# Patient Record
Sex: MALE | Race: WHITE | NOT HISPANIC OR LATINO | Employment: STUDENT | URBAN - METROPOLITAN AREA
[De-identification: names, ages, dates, MRNs, and addresses within clinical notes are randomized per-mention and may not be internally consistent; named-entity substitution may affect disease eponyms.]

---

## 2019-06-25 ENCOUNTER — OFFICE VISIT (OUTPATIENT)
Dept: URGENT CARE | Facility: CLINIC | Age: 39
End: 2019-06-25
Payer: COMMERCIAL

## 2019-06-25 VITALS
TEMPERATURE: 97.6 F | DIASTOLIC BLOOD PRESSURE: 88 MMHG | BODY MASS INDEX: 27.1 KG/M2 | OXYGEN SATURATION: 95 % | HEIGHT: 71 IN | RESPIRATION RATE: 16 BRPM | HEART RATE: 93 BPM | WEIGHT: 193.6 LBS | SYSTOLIC BLOOD PRESSURE: 120 MMHG

## 2019-06-25 DIAGNOSIS — H57.89 REDNESS OF EYE, RIGHT: ICD-10-CM

## 2019-06-25 DIAGNOSIS — S05.01XA ABRASION OF RIGHT CORNEA, INITIAL ENCOUNTER: Primary | ICD-10-CM

## 2019-06-25 PROCEDURE — G0382 LEV 3 HOSP TYPE B ED VISIT: HCPCS | Performed by: FAMILY MEDICINE

## 2019-06-25 PROCEDURE — 99283 EMERGENCY DEPT VISIT LOW MDM: CPT | Performed by: FAMILY MEDICINE

## 2019-06-25 RX ORDER — LEVOTHYROXINE SODIUM 0.03 MG/1
25 TABLET ORAL DAILY
Refills: 1 | COMMUNITY
Start: 2019-04-10

## 2019-06-25 RX ORDER — TETRACAINE HYDROCHLORIDE 5 MG/ML
1 SOLUTION OPHTHALMIC ONCE
Status: COMPLETED | OUTPATIENT
Start: 2019-06-25 | End: 2019-06-25

## 2019-06-25 RX ORDER — DEXTROAMPHETAMINE SACCHARATE, AMPHETAMINE ASPARTATE, DEXTROAMPHETAMINE SULFATE AND AMPHETAMINE SULFATE 7.5; 7.5; 7.5; 7.5 MG/1; MG/1; MG/1; MG/1
TABLET ORAL
Refills: 0 | COMMUNITY
Start: 2019-06-04

## 2019-06-25 RX ORDER — TOBRAMYCIN 3 MG/ML
1 SOLUTION/ DROPS OPHTHALMIC
Qty: 5 ML | Refills: 0 | Status: SHIPPED | OUTPATIENT
Start: 2019-06-25

## 2019-06-25 RX ORDER — VALACYCLOVIR HYDROCHLORIDE 1 G/1
TABLET, FILM COATED ORAL
COMMUNITY
Start: 2018-11-16

## 2019-06-25 RX ORDER — FLUTICASONE PROPIONATE 50 MCG
1 SPRAY, SUSPENSION (ML) NASAL DAILY
COMMUNITY
Start: 2014-12-22

## 2019-06-25 RX ORDER — FEXOFENADINE HCL 180 MG/1
1 TABLET ORAL DAILY
COMMUNITY
Start: 2014-09-15

## 2019-06-25 RX ORDER — DEXTROAMPHETAMINE SACCHARATE, AMPHETAMINE ASPARTATE, DEXTROAMPHETAMINE SULFATE AND AMPHETAMINE SULFATE 2.5; 2.5; 2.5; 2.5 MG/1; MG/1; MG/1; MG/1
TABLET ORAL
COMMUNITY
Start: 2019-05-31

## 2019-06-25 RX ADMIN — TETRACAINE HYDROCHLORIDE 1 DROP: 5 SOLUTION OPHTHALMIC at 14:59

## 2024-04-13 ENCOUNTER — OFFICE VISIT (OUTPATIENT)
Dept: URGENT CARE | Facility: CLINIC | Age: 44
End: 2024-04-13
Payer: COMMERCIAL

## 2024-04-13 VITALS
HEART RATE: 96 BPM | OXYGEN SATURATION: 97 % | WEIGHT: 190 LBS | RESPIRATION RATE: 14 BRPM | BODY MASS INDEX: 26.6 KG/M2 | HEIGHT: 71 IN | TEMPERATURE: 100 F

## 2024-04-13 DIAGNOSIS — B34.9 VIRAL INFECTION: Primary | ICD-10-CM

## 2024-04-13 PROCEDURE — G0383 LEV 4 HOSP TYPE B ED VISIT: HCPCS | Performed by: PHYSICIAN ASSISTANT

## 2024-04-13 PROCEDURE — 99284 EMERGENCY DEPT VISIT MOD MDM: CPT | Performed by: PHYSICIAN ASSISTANT

## 2024-04-13 NOTE — PROGRESS NOTES
[facility]        NAME: Efrain Aldana is a 43 y.o. male  : 1980    MRN: [id number]  DATE: 2024  TIME: 9:41 AM    Assessment and Plan   Viral infection [B34.9]  1. Viral infection              Patient Instructions       Follow up with PCP in 3-5 days.  Proceed to  ER if symptoms worsen.    If tests have been performed at [facility], our office will contact you with results if changes need to be made to the care plan discussed with you at the visit.  [facility]    Chief Complaint     Chief Complaint   Patient presents with    head congestion      Pt states that it started Thursday night. Pt states that he has a headache, fever, loss of wanting to eat.          History of Present Illness       Patient presents with 2 days of fever, sinus congestion, fatigue, cloudiness, runny nose, loss of appetite, cough.  Also a few episodes of vomiting. Had diarrhea but that resolved.   Denies rash, chest pains, SOB, dyspnea, abdominal pains,   Daughter sick with similar symptoms recently.         Review of Systems   Review of Systems   Constitutional:  Positive for appetite change, chills, fatigue and fever.   HENT:  Positive for congestion, rhinorrhea and sinus pressure. Negative for ear discharge, ear pain, postnasal drip, sinus pain and sore throat.    Respiratory:  Positive for cough. Negative for chest tightness, shortness of breath and wheezing.    Cardiovascular:  Negative for chest pain and palpitations.   Gastrointestinal:  Positive for diarrhea, nausea and vomiting. Negative for abdominal pain.   Musculoskeletal:  Positive for myalgias. Negative for arthralgias.   Neurological:  Positive for weakness and headaches.   Psychiatric/Behavioral:  Negative for confusion.          Current Medications       Current Outpatient Medications:     amphetamine-dextroamphetamine (ADDERALL) 30 MG tablet, TAKE 1 TABLET (30 MG TOTAL) BY MOUTH DAILY. MAX DAILY AMOUNT: 30  "MG, Disp: , Rfl: 0    amphetamine-dextroamphetamine (ADDERALL, 10MG,) 10 mg tablet, Use in the afternoon if needed, Disp: , Rfl:     fexofenadine (ALLEGRA) 180 MG tablet, Take 1 tablet by mouth daily, Disp: , Rfl:     fluticasone (FLONASE) 50 mcg/act nasal spray, 1 spray into each nostril daily, Disp: , Rfl:     levothyroxine 25 mcg tablet, Take 25 mcg by mouth daily, Disp: , Rfl: 1    tobramycin (TOBREX) 0.3 % SOLN, Administer 1 drop to the right eye every 4 (four) hours while awake, Disp: 5 mL, Rfl: 0    valACYclovir (VALTREX) 1,000 mg tablet, 2 TABLETS EVERY 12 HOURS X 2 DOSES as needed for cold sores, Disp: , Rfl:     Current Allergies     Allergies as of 04/13/2024    (No Known Allergies)            The following portions of the patient's history were reviewed and updated as appropriate: allergies, current medications, past family history, past medical history, past social history, past surgical history and problem list.     Past Medical History:   Diagnosis Date    ADHD (attention deficit hyperactivity disorder)     Disease of thyroid gland        Past Surgical History:   Procedure Laterality Date    WISDOM TOOTH EXTRACTION         Family History   Problem Relation Age of Onset    Hyperlipidemia Mother     Hyperlipidemia Father          Medications have been verified.        Objective   Pulse 96   Temp 100 °F (37.8 °C) (Tympanic)   Resp 14   Ht 5' 11\" (1.803 m)   Wt 86.2 kg (190 lb)   SpO2 97%   BMI 26.50 kg/m²   No LMP for male patient.       Physical Exam     Physical Exam  Constitutional:       General: He is not in acute distress.     Appearance: Normal appearance. He is not ill-appearing or diaphoretic.   HENT:      Right Ear: Tympanic membrane, ear canal and external ear normal.      Left Ear: Tympanic membrane, ear canal and external ear normal.      Nose: Nose normal.      Mouth/Throat:      Mouth: Mucous membranes are moist.      Pharynx: Oropharynx is clear.   Eyes:      Conjunctiva/sclera: " Conjunctivae normal.   Cardiovascular:      Rate and Rhythm: Normal rate and regular rhythm.      Heart sounds: Normal heart sounds.   Pulmonary:      Effort: Pulmonary effort is normal.      Breath sounds: Normal breath sounds.   Abdominal:      General: Abdomen is flat. Bowel sounds are normal.      Palpations: Abdomen is soft.      Tenderness: There is no abdominal tenderness. There is no guarding or rebound.   Skin:     General: Skin is warm and dry.   Neurological:      Mental Status: He is alert.   Psychiatric:         Mood and Affect: Mood normal.         Behavior: Behavior normal.

## 2024-12-03 ENCOUNTER — TELEPHONE (OUTPATIENT)
Age: 44
End: 2024-12-03

## 2024-12-03 ENCOUNTER — OFFICE VISIT (OUTPATIENT)
Dept: GASTROENTEROLOGY | Facility: CLINIC | Age: 44
End: 2024-12-03
Payer: COMMERCIAL

## 2024-12-03 ENCOUNTER — TELEPHONE (OUTPATIENT)
Dept: GASTROENTEROLOGY | Facility: CLINIC | Age: 44
End: 2024-12-03

## 2024-12-03 VITALS
BODY MASS INDEX: 27.44 KG/M2 | HEIGHT: 71 IN | TEMPERATURE: 97.5 F | WEIGHT: 196 LBS | DIASTOLIC BLOOD PRESSURE: 90 MMHG | SYSTOLIC BLOOD PRESSURE: 130 MMHG

## 2024-12-03 DIAGNOSIS — R19.4 CHANGE IN BOWEL HABITS: ICD-10-CM

## 2024-12-03 DIAGNOSIS — T18.128A ESOPHAGEAL OBSTRUCTION DUE TO FOOD IMPACTION: Primary | ICD-10-CM
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
## 2024-12-03 DIAGNOSIS — R12 HEARTBURN: ICD-10-CM

## 2024-12-03 PROCEDURE — 99204 OFFICE O/P NEW MOD 45 MIN: CPT | Performed by: PHYSICIAN ASSISTANT

## 2024-12-03 RX ORDER — SUCRALFATE ORAL 1 G/10ML
1 SUSPENSION ORAL 4 TIMES DAILY
Qty: 1200 ML | Refills: 3 | Status: SHIPPED | OUTPATIENT
Start: 2024-12-03

## 2024-12-03 RX ORDER — OMEPRAZOLE 40 MG/1
40 CAPSULE, DELAYED RELEASE ORAL 2 TIMES DAILY
COMMUNITY
Start: 2024-11-28 | End: 2024-12-28

## 2024-12-03 RX ORDER — SODIUM CHLORIDE, SODIUM LACTATE, POTASSIUM CHLORIDE, CALCIUM CHLORIDE 600; 310; 30; 20 MG/100ML; MG/100ML; MG/100ML; MG/100ML
125 INJECTION, SOLUTION INTRAVENOUS CONTINUOUS
Status: CANCELLED | OUTPATIENT
Start: 2024-12-03

## 2024-12-03 NOTE — H&P (VIEW-ONLY)
"Name: Efrain Aldana      : 1980      MRN: [id number]  Encounter Provider: Gissell Gilmore PA-C  Encounter Date: 12/3/2024   Encounter department: [facility] [place]  :  Assessment & Plan  Esophageal obstruction due to food impaction  Patient says that he has had issues with reflux and trouble swallowing solid foods intermittently \"for a while \" however this recently started to worsen and has been occurring more often.  Patient presented to [facility] on  with the feeling of choking and food stuck in his esophagus after eating lobster. CT noted \"  Mildly patulous and fluid-filled esophagus with mild wall thickening which   can indicate reflux and/or esophagitis. \" They trialed PPI, famotidine and glucagon in the ER without relief so he underwent emergent EGD at that time.  The EGD noted that the food bolus was in the distal third of the esophagus at the gastroesophageal junction which was removed both with a 3 prong and then pushed the rest into the stomach.  The EGD also noted \"the upper and middle thirds of esophagus showed mucosal findings suspicious for EOE\" however biopsies were not done.  It was discussed at his discharge that he needs an outpatient EGD with biopsies to rule out EOE.  He says he has only been on omeprazole twice a day for the last day or so but continues to have trouble swallowing solid food several times a week.  He does usually does not have issues liquids.  He does admit to eating less over the last week or so since this occurred but also says that he has been having constipation, which is unlike him.  He says that when he does move his bowels, only \"small amount of hard keya.\" He says he has been having BL LQ discomfort since this started. Pt does admit to drinking \"a lot of redbull\" and not eating throughout the day.  -EGD ordered with esophageal, gastric, duodenal bx ordered   -continue omeprazole 40 mg BID  -start " "carafate suspension as needed   -anti-gerd diet dicussed and handout printed     Orders:    EGD; Future    Change in bowel habits  See above.   -explained this certainly can be happening due to him eating less but he and his wife would like a colonoscopy done to ensure, which I explained is a good idea especially with his age  -start OTC miralax daily (pt's wife says she may give him the OTC dulcolax at home first)  -please ensure you are drinking at least 64 ounces of water/day  Orders:    Colonoscopy; Future        History of Present Illness     HPI  Efrain Aldana is a 44 y.o. male who presents for hospital follow-up. Patient says that he has had issues with reflux and trouble swallowing solid foods intermittently \"for a while \" however this recently started to worsen and has been occurring more often.  He says he has only been on omeprazole twice a day for the last day or so but continues to have trouble swallowing solid food several times a week.  He does usually does not have issues liquids.  He does admit to eating less over the last week or so since this occurred but also says that he has been having constipation, which is unlike him.  He says that when he does move his bowels, only \"small amount of hard keya.\" He says he has been having BL LQ discomfort since this started. He denies n/v, painful swallowing, diarrhea, family hx of colon cancer, unintentional weight loss, fevers, chills, night sweats, frequent NSAID use, bloody or black BM. Pt does admit to drinking \"a lot of redbull\" and not eating throughout the day.  History obtained from: patient    Review of Systems   Constitutional:  Negative for chills and fever.   HENT:  Positive for trouble swallowing. Negative for ear pain and sore throat.    Eyes:  Negative for pain and visual disturbance.   Respiratory:  Negative for cough and shortness of breath.    Cardiovascular:  Negative for chest pain and palpitations.   Gastrointestinal:  Positive for " abdominal pain and constipation. Negative for abdominal distention, anal bleeding, blood in stool, diarrhea, nausea, rectal pain and vomiting.   Genitourinary:  Negative for dysuria and hematuria.   Musculoskeletal:  Negative for arthralgias and back pain.   Skin:  Negative for color change and rash.   Neurological:  Negative for seizures and syncope.   All other systems reviewed and are negative.    Past Medical History   Past Medical History:   Diagnosis Date    ADHD (attention deficit hyperactivity disorder)     Disease of thyroid gland      Past Surgical History:   Procedure Laterality Date    WISDOM TOOTH EXTRACTION       Family History   Problem Relation Age of Onset    Hyperlipidemia Mother     Hyperlipidemia Father       reports that he has never smoked. He has never used smokeless tobacco. He reports current alcohol use. He reports that he does not use drugs.  Current Outpatient Medications on File Prior to Visit   Medication Sig Dispense Refill    amphetamine-dextroamphetamine (ADDERALL) 30 MG tablet TAKE 1 TABLET (30 MG TOTAL) BY MOUTH DAILY. MAX DAILY AMOUNT: 30 MG  0    amphetamine-dextroamphetamine (ADDERALL, 10MG,) 10 mg tablet Use in the afternoon if needed      fexofenadine (ALLEGRA) 180 MG tablet Take 1 tablet by mouth daily      fluticasone (FLONASE) 50 mcg/act nasal spray 1 spray into each nostril daily      levothyroxine 25 mcg tablet Take 25 mcg by mouth daily  1    tobramycin (TOBREX) 0.3 % SOLN Administer 1 drop to the right eye every 4 (four) hours while awake 5 mL 0    valACYclovir (VALTREX) 1,000 mg tablet 2 TABLETS EVERY 12 HOURS X 2 DOSES as needed for cold sores       No current facility-administered medications on file prior to visit.   No Known Allergies   Medical History Reviewed by provider this encounter:     .  Past Medical History   Past Medical History:   Diagnosis Date    ADHD (attention deficit hyperactivity disorder)     Disease of thyroid gland      Past Surgical History:    Procedure Laterality Date    WISDOM TOOTH EXTRACTION       Family History   Problem Relation Age of Onset    Hyperlipidemia Mother     Hyperlipidemia Father       reports that he has never smoked. He has never used smokeless tobacco. He reports current alcohol use. He reports that he does not use drugs.  Current Outpatient Medications on File Prior to Visit   Medication Sig Dispense Refill    amphetamine-dextroamphetamine (ADDERALL) 30 MG tablet TAKE 1 TABLET (30 MG TOTAL) BY MOUTH DAILY. MAX DAILY AMOUNT: 30 MG (Patient taking differently: 30 mg)  0    amphetamine-dextroamphetamine (ADDERALL, 10MG,) 10 mg tablet Use in the afternoon if needed (Patient taking differently: 15 mg)      fexofenadine (ALLEGRA) 180 MG tablet Take 1 tablet by mouth daily      levothyroxine 25 mcg tablet Take 25 mcg by mouth daily  1    omeprazole (PriLOSEC) 40 MG capsule Take 40 mg by mouth 2 (two) times a day      valACYclovir (VALTREX) 1,000 mg tablet 2 TABLETS EVERY 12 HOURS X 2 DOSES as needed for cold sores      fluticasone (FLONASE) 50 mcg/act nasal spray 1 spray into each nostril daily (Patient not taking: Reported on 12/3/2024)      tobramycin (TOBREX) 0.3 % SOLN Administer 1 drop to the right eye every 4 (four) hours while awake (Patient not taking: Reported on 12/3/2024) 5 mL 0     No current facility-administered medications on file prior to visit.   No Known Allergies   Current Outpatient Medications on File Prior to Visit   Medication Sig Dispense Refill    amphetamine-dextroamphetamine (ADDERALL) 30 MG tablet TAKE 1 TABLET (30 MG TOTAL) BY MOUTH DAILY. MAX DAILY AMOUNT: 30 MG (Patient taking differently: 30 mg)  0    amphetamine-dextroamphetamine (ADDERALL, 10MG,) 10 mg tablet Use in the afternoon if needed (Patient taking differently: 15 mg)      fexofenadine (ALLEGRA) 180 MG tablet Take 1 tablet by mouth daily      levothyroxine 25 mcg tablet Take 25 mcg by mouth daily  1    omeprazole (PriLOSEC) 40 MG capsule Take 40 mg  by mouth 2 (two) times a day      valACYclovir (VALTREX) 1,000 mg tablet 2 TABLETS EVERY 12 HOURS X 2 DOSES as needed for cold sores      fluticasone (FLONASE) 50 mcg/act nasal spray 1 spray into each nostril daily (Patient not taking: Reported on 12/3/2024)      tobramycin (TOBREX) 0.3 % SOLN Administer 1 drop to the right eye every 4 (four) hours while awake (Patient not taking: Reported on 12/3/2024) 5 mL 0     No current facility-administered medications on file prior to visit.      Social History     Tobacco Use    Smoking status: Never    Smokeless tobacco: Never   Substance and Sexual Activity    Alcohol use: Yes    Drug use: Never    Sexual activity: Not on file        Objective   There were no vitals taken for this visit.     Physical Exam  Constitutional:       Appearance: Normal appearance.   Cardiovascular:      Rate and Rhythm: Normal rate and regular rhythm.      Heart sounds: Normal heart sounds.   Pulmonary:      Breath sounds: Normal breath sounds.   Abdominal:      General: Abdomen is flat. Bowel sounds are normal. There is no distension.      Palpations: Abdomen is soft. There is no mass.      Tenderness: There is abdominal tenderness in the left lower quadrant. There is no right CVA tenderness, left CVA tenderness, guarding or rebound.      Hernia: No hernia is present.   Neurological:      Mental Status: He is alert.         Administrative Statements   I have spent a total time of 30 minutes in caring for this patient on the day of the visit/encounter including Diagnostic results, Prognosis, Risks and benefits of tx options, Instructions for management, Patient and family education, Importance of tx compliance, Risk factor reductions, Impressions, Counseling / Coordination of care, Documenting in the medical record, Reviewing / ordering tests, medicine, procedures  , Obtaining or reviewing history  , and Communicating with other healthcare professionals .

## 2024-12-03 NOTE — TELEPHONE ENCOUNTER
LOV 12/03/24     reports her [facility] pharmacy informed her that the script for sucralfate suspension requires medical necessity documentation for approval. Out of pocket cost would be ~ $600.00.     Sucralfate tablets are approved. Discussed instructions for preparing a slurry by crushing/dissolving table with war water.  prefers to begin with PA for the suspension. She is informed that determination can take 7-21 business days.     will purchase a 7 day supply of sucralfate suspension for $50.00 d/t pt's current symptoms. If PA determination takes to long or is denied, pt will move forward with the tablet form.

## 2024-12-03 NOTE — PATIENT INSTRUCTIONS
Start taking omeprazole 40 mg ONE HOUR after the synthroid, wait 30 minutes, eat something, then have your caffeine after this if needed. Take your second dose 30 minutes before dinner   Try not to eat late at night BUT do not go to bed hungry: stay upright for at least 2 hours   Make sure you are eating throughout the day, every 4 hours or so  Make sure you are drinking at least 64 ounces of water/day   When you are constipated: over-the-counter miralax as needed for constipation     Scheduled date of EGD/colonoscopy (as of today):12.06.24  Physician performing EGD/colonoscopy:DR [facility]  [facility] of [facility]colonoscopy[facility]  Desired bowel prep reviewed with patient:ELIEZER  Instructions reviewed with patient by:JANINE  Clearances: N/A

## 2024-12-03 NOTE — PROGRESS NOTES
"Name: Efrain Aldana      : 1980      MRN: [id number]  Encounter Provider: Gissell Gilmore PA-C  Encounter Date: 12/3/2024   Encounter department: [facility] [place]  :  Assessment & Plan  Esophageal obstruction due to food impaction  Patient says that he has had issues with reflux and trouble swallowing solid foods intermittently \"for a while \" however this recently started to worsen and has been occurring more often.  Patient presented to [facility] on  with the feeling of choking and food stuck in his esophagus after eating lobster. CT noted \"  Mildly patulous and fluid-filled esophagus with mild wall thickening which   can indicate reflux and/or esophagitis. \" They trialed PPI, famotidine and glucagon in the ER without relief so he underwent emergent EGD at that time.  The EGD noted that the food bolus was in the distal third of the esophagus at the gastroesophageal junction which was removed both with a 3 prong and then pushed the rest into the stomach.  The EGD also noted \"the upper and middle thirds of esophagus showed mucosal findings suspicious for EOE\" however biopsies were not done.  It was discussed at his discharge that he needs an outpatient EGD with biopsies to rule out EOE.  He says he has only been on omeprazole twice a day for the last day or so but continues to have trouble swallowing solid food several times a week.  He does usually does not have issues liquids.  He does admit to eating less over the last week or so since this occurred but also says that he has been having constipation, which is unlike him.  He says that when he does move his bowels, only \"small amount of hard keya.\" He says he has been having BL LQ discomfort since this started. Pt does admit to drinking \"a lot of redbull\" and not eating throughout the day.  -EGD ordered with esophageal, gastric, duodenal bx ordered   -continue omeprazole 40 mg BID  -start " "carafate suspension as needed   -anti-gerd diet dicussed and handout printed     Orders:    EGD; Future    Change in bowel habits  See above.   -explained this certainly can be happening due to him eating less but he and his wife would like a colonoscopy done to ensure, which I explained is a good idea especially with his age  -start OTC miralax daily (pt's wife says she may give him the OTC dulcolax at home first)  -please ensure you are drinking at least 64 ounces of water/day  Orders:    Colonoscopy; Future        History of Present Illness     HPI  Efrain Aldana is a 44 y.o. male who presents for hospital follow-up. Patient says that he has had issues with reflux and trouble swallowing solid foods intermittently \"for a while \" however this recently started to worsen and has been occurring more often.  He says he has only been on omeprazole twice a day for the last day or so but continues to have trouble swallowing solid food several times a week.  He does usually does not have issues liquids.  He does admit to eating less over the last week or so since this occurred but also says that he has been having constipation, which is unlike him.  He says that when he does move his bowels, only \"small amount of hard keya.\" He says he has been having BL LQ discomfort since this started. He denies n/v, painful swallowing, diarrhea, family hx of colon cancer, unintentional weight loss, fevers, chills, night sweats, frequent NSAID use, bloody or black BM. Pt does admit to drinking \"a lot of redbull\" and not eating throughout the day.  History obtained from: patient    Review of Systems   Constitutional:  Negative for chills and fever.   HENT:  Positive for trouble swallowing. Negative for ear pain and sore throat.    Eyes:  Negative for pain and visual disturbance.   Respiratory:  Negative for cough and shortness of breath.    Cardiovascular:  Negative for chest pain and palpitations.   Gastrointestinal:  Positive for " abdominal pain and constipation. Negative for abdominal distention, anal bleeding, blood in stool, diarrhea, nausea, rectal pain and vomiting.   Genitourinary:  Negative for dysuria and hematuria.   Musculoskeletal:  Negative for arthralgias and back pain.   Skin:  Negative for color change and rash.   Neurological:  Negative for seizures and syncope.   All other systems reviewed and are negative.    Past Medical History   Past Medical History:   Diagnosis Date    ADHD (attention deficit hyperactivity disorder)     Disease of thyroid gland      Past Surgical History:   Procedure Laterality Date    WISDOM TOOTH EXTRACTION       Family History   Problem Relation Age of Onset    Hyperlipidemia Mother     Hyperlipidemia Father       reports that he has never smoked. He has never used smokeless tobacco. He reports current alcohol use. He reports that he does not use drugs.  Current Outpatient Medications on File Prior to Visit   Medication Sig Dispense Refill    amphetamine-dextroamphetamine (ADDERALL) 30 MG tablet TAKE 1 TABLET (30 MG TOTAL) BY MOUTH DAILY. MAX DAILY AMOUNT: 30 MG  0    amphetamine-dextroamphetamine (ADDERALL, 10MG,) 10 mg tablet Use in the afternoon if needed      fexofenadine (ALLEGRA) 180 MG tablet Take 1 tablet by mouth daily      fluticasone (FLONASE) 50 mcg/act nasal spray 1 spray into each nostril daily      levothyroxine 25 mcg tablet Take 25 mcg by mouth daily  1    tobramycin (TOBREX) 0.3 % SOLN Administer 1 drop to the right eye every 4 (four) hours while awake 5 mL 0    valACYclovir (VALTREX) 1,000 mg tablet 2 TABLETS EVERY 12 HOURS X 2 DOSES as needed for cold sores       No current facility-administered medications on file prior to visit.   No Known Allergies   Medical History Reviewed by provider this encounter:     .  Past Medical History   Past Medical History:   Diagnosis Date    ADHD (attention deficit hyperactivity disorder)     Disease of thyroid gland      Past Surgical History:    Procedure Laterality Date    WISDOM TOOTH EXTRACTION       Family History   Problem Relation Age of Onset    Hyperlipidemia Mother     Hyperlipidemia Father       reports that he has never smoked. He has never used smokeless tobacco. He reports current alcohol use. He reports that he does not use drugs.  Current Outpatient Medications on File Prior to Visit   Medication Sig Dispense Refill    amphetamine-dextroamphetamine (ADDERALL) 30 MG tablet TAKE 1 TABLET (30 MG TOTAL) BY MOUTH DAILY. MAX DAILY AMOUNT: 30 MG (Patient taking differently: 30 mg)  0    amphetamine-dextroamphetamine (ADDERALL, 10MG,) 10 mg tablet Use in the afternoon if needed (Patient taking differently: 15 mg)      fexofenadine (ALLEGRA) 180 MG tablet Take 1 tablet by mouth daily      levothyroxine 25 mcg tablet Take 25 mcg by mouth daily  1    omeprazole (PriLOSEC) 40 MG capsule Take 40 mg by mouth 2 (two) times a day      valACYclovir (VALTREX) 1,000 mg tablet 2 TABLETS EVERY 12 HOURS X 2 DOSES as needed for cold sores      fluticasone (FLONASE) 50 mcg/act nasal spray 1 spray into each nostril daily (Patient not taking: Reported on 12/3/2024)      tobramycin (TOBREX) 0.3 % SOLN Administer 1 drop to the right eye every 4 (four) hours while awake (Patient not taking: Reported on 12/3/2024) 5 mL 0     No current facility-administered medications on file prior to visit.   No Known Allergies   Current Outpatient Medications on File Prior to Visit   Medication Sig Dispense Refill    amphetamine-dextroamphetamine (ADDERALL) 30 MG tablet TAKE 1 TABLET (30 MG TOTAL) BY MOUTH DAILY. MAX DAILY AMOUNT: 30 MG (Patient taking differently: 30 mg)  0    amphetamine-dextroamphetamine (ADDERALL, 10MG,) 10 mg tablet Use in the afternoon if needed (Patient taking differently: 15 mg)      fexofenadine (ALLEGRA) 180 MG tablet Take 1 tablet by mouth daily      levothyroxine 25 mcg tablet Take 25 mcg by mouth daily  1    omeprazole (PriLOSEC) 40 MG capsule Take 40 mg  by mouth 2 (two) times a day      valACYclovir (VALTREX) 1,000 mg tablet 2 TABLETS EVERY 12 HOURS X 2 DOSES as needed for cold sores      fluticasone (FLONASE) 50 mcg/act nasal spray 1 spray into each nostril daily (Patient not taking: Reported on 12/3/2024)      tobramycin (TOBREX) 0.3 % SOLN Administer 1 drop to the right eye every 4 (four) hours while awake (Patient not taking: Reported on 12/3/2024) 5 mL 0     No current facility-administered medications on file prior to visit.      Social History     Tobacco Use    Smoking status: Never    Smokeless tobacco: Never   Substance and Sexual Activity    Alcohol use: Yes    Drug use: Never    Sexual activity: Not on file        Objective   There were no vitals taken for this visit.     Physical Exam  Constitutional:       Appearance: Normal appearance.   Cardiovascular:      Rate and Rhythm: Normal rate and regular rhythm.      Heart sounds: Normal heart sounds.   Pulmonary:      Breath sounds: Normal breath sounds.   Abdominal:      General: Abdomen is flat. Bowel sounds are normal. There is no distension.      Palpations: Abdomen is soft. There is no mass.      Tenderness: There is abdominal tenderness in the left lower quadrant. There is no right CVA tenderness, left CVA tenderness, guarding or rebound.      Hernia: No hernia is present.   Neurological:      Mental Status: He is alert.         Administrative Statements   I have spent a total time of 30 minutes in caring for this patient on the day of the visit/encounter including Diagnostic results, Prognosis, Risks and benefits of tx options, Instructions for management, Patient and family education, Importance of tx compliance, Risk factor reductions, Impressions, Counseling / Coordination of care, Documenting in the medical record, Reviewing / ordering tests, medicine, procedures  , Obtaining or reviewing history  , and Communicating with other healthcare professionals .

## 2024-12-03 NOTE — TELEPHONE ENCOUNTER
Patients GI provider:  WILSON Garcia    Number to return call: [phone]    Reason for call: Medical Records at [facility] needs to be called and request a Care Everywhere ID so that the images of pt's EGD can be sent and visible. Pt has an appt today 11:30 in [facility]. Please reach out to [facility] medical records    Scheduled procedure/appointment date if applicable: Apt/procedure 12/03/24

## 2024-12-04 ENCOUNTER — TELEPHONE (OUTPATIENT)
Age: 44
End: 2024-12-04

## 2024-12-04 DIAGNOSIS — Z12.11 SCREENING FOR COLON CANCER: Primary | ICD-10-CM

## 2024-12-04 RX ORDER — BISACODYL 5 MG/1
TABLET, DELAYED RELEASE ORAL
Qty: 4 TABLET | Refills: 0 | Status: SHIPPED | OUTPATIENT
Start: 2024-12-04 | End: 2024-12-06 | Stop reason: HOSPADM

## 2024-12-04 NOTE — TELEPHONE ENCOUNTER
PA for sucralfate (CARAFATE) 1 g/10 mL suspension     SUBMITTED to [employer]    via    []CMM-KEY:   [x]Joseph-Case ID #     [id number]    Payer: [employer] [facility]   Phone: [phone]   Fax: [phone]         All office notes, labs and other pertaining documents and studies sent. Clinical questions answered. Awaiting determination from insurance company.     Turnaround time for your insurance to make a decision on your Prior Authorization can take 7-21 business days.

## 2024-12-04 NOTE — TELEPHONE ENCOUNTER
Patients GI provider:  Dr. ROMERO / ANGELICA Gilmore    Number to return call: [phone]    Reason for call: Pt calling stating his pharmacy did not receive Golytely RX for his procedure. Please send asa     Scheduled procedure/appointment date if applicable: 12/6/24

## 2024-12-05 RX ORDER — SODIUM CHLORIDE 9 MG/ML
125 INJECTION, SOLUTION INTRAVENOUS CONTINUOUS
Status: CANCELLED | OUTPATIENT
Start: 2024-12-05

## 2024-12-06 ENCOUNTER — ANESTHESIA EVENT (OUTPATIENT)
Dept: GASTROENTEROLOGY | Facility: MEDICAL CENTER | Age: 44
End: 2024-12-06
Payer: COMMERCIAL

## 2024-12-06 ENCOUNTER — ANESTHESIA (OUTPATIENT)
Dept: GASTROENTEROLOGY | Facility: MEDICAL CENTER | Age: 44
End: 2024-12-06
Payer: COMMERCIAL

## 2024-12-06 ENCOUNTER — TELEPHONE (OUTPATIENT)
Dept: GASTROENTEROLOGY | Facility: MEDICAL CENTER | Age: 44
End: 2024-12-06

## 2024-12-06 ENCOUNTER — HOSPITAL ENCOUNTER (OUTPATIENT)
Dept: GASTROENTEROLOGY | Facility: MEDICAL CENTER | Age: 44
Setting detail: OUTPATIENT SURGERY
End: 2024-12-06
Payer: COMMERCIAL

## 2024-12-06 VITALS
TEMPERATURE: 96.6 F | OXYGEN SATURATION: 99 % | RESPIRATION RATE: 20 BRPM | BODY MASS INDEX: 27.44 KG/M2 | HEIGHT: 71 IN | WEIGHT: 196 LBS | SYSTOLIC BLOOD PRESSURE: 125 MMHG | DIASTOLIC BLOOD PRESSURE: 86 MMHG | HEART RATE: 65 BPM

## 2024-12-06 DIAGNOSIS — R19.4 CHANGE IN BOWEL HABITS: ICD-10-CM

## 2024-12-06 DIAGNOSIS — T18.128A ESOPHAGEAL OBSTRUCTION DUE TO FOOD IMPACTION: ICD-10-CM
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
## 2024-12-06 PROCEDURE — 43249 ESOPH EGD DILATION <30 MM: CPT | Performed by: STUDENT IN AN ORGANIZED HEALTH CARE EDUCATION/TRAINING PROGRAM

## 2024-12-06 PROCEDURE — 88305 TISSUE EXAM BY PATHOLOGIST: CPT | Performed by: PATHOLOGY

## 2024-12-06 PROCEDURE — 45380 COLONOSCOPY AND BIOPSY: CPT | Performed by: STUDENT IN AN ORGANIZED HEALTH CARE EDUCATION/TRAINING PROGRAM

## 2024-12-06 PROCEDURE — C1726 CATH, BAL DIL, NON-VASCULAR: HCPCS

## 2024-12-06 PROCEDURE — 45385 COLONOSCOPY W/LESION REMOVAL: CPT | Performed by: STUDENT IN AN ORGANIZED HEALTH CARE EDUCATION/TRAINING PROGRAM

## 2024-12-06 PROCEDURE — 43239 EGD BIOPSY SINGLE/MULTIPLE: CPT | Performed by: STUDENT IN AN ORGANIZED HEALTH CARE EDUCATION/TRAINING PROGRAM

## 2024-12-06 RX ORDER — SODIUM CHLORIDE 9 MG/ML
125 INJECTION, SOLUTION INTRAVENOUS CONTINUOUS
Status: DISCONTINUED | OUTPATIENT
Start: 2024-12-06 | End: 2024-12-10 | Stop reason: HOSPADM

## 2024-12-06 RX ORDER — PROPOFOL 10 MG/ML
INJECTION, EMULSION INTRAVENOUS AS NEEDED
Status: DISCONTINUED | OUTPATIENT
Start: 2024-12-06 | End: 2024-12-06

## 2024-12-06 RX ORDER — GLYCOPYRROLATE 0.2 MG/ML
INJECTION INTRAMUSCULAR; INTRAVENOUS AS NEEDED
Status: DISCONTINUED | OUTPATIENT
Start: 2024-12-06 | End: 2024-12-06

## 2024-12-06 RX ORDER — SODIUM CHLORIDE, SODIUM LACTATE, POTASSIUM CHLORIDE, CALCIUM CHLORIDE 600; 310; 30; 20 MG/100ML; MG/100ML; MG/100ML; MG/100ML
125 INJECTION, SOLUTION INTRAVENOUS CONTINUOUS
Status: DISCONTINUED | OUTPATIENT
Start: 2024-12-06 | End: 2024-12-10 | Stop reason: HOSPADM

## 2024-12-06 RX ADMIN — PROPOFOL 40 MG: 10 INJECTION, EMULSION INTRAVENOUS at 13:59

## 2024-12-06 RX ADMIN — PROPOFOL 40 MG: 10 INJECTION, EMULSION INTRAVENOUS at 13:52

## 2024-12-06 RX ADMIN — PROPOFOL 20 MG: 10 INJECTION, EMULSION INTRAVENOUS at 14:11

## 2024-12-06 RX ADMIN — PROPOFOL 140 MG: 10 INJECTION, EMULSION INTRAVENOUS at 13:49

## 2024-12-06 RX ADMIN — PROPOFOL 40 MG: 10 INJECTION, EMULSION INTRAVENOUS at 13:51

## 2024-12-06 RX ADMIN — PROPOFOL 40 MG: 10 INJECTION, EMULSION INTRAVENOUS at 14:07

## 2024-12-06 RX ADMIN — PROPOFOL 40 MG: 10 INJECTION, EMULSION INTRAVENOUS at 13:50

## 2024-12-06 RX ADMIN — GLYCOPYRROLATE 0.2 MG: 0.2 INJECTION, SOLUTION INTRAMUSCULAR; INTRAVENOUS at 13:49

## 2024-12-06 RX ADMIN — PROPOFOL 40 MG: 10 INJECTION, EMULSION INTRAVENOUS at 13:58

## 2024-12-06 RX ADMIN — PROPOFOL 40 MG: 10 INJECTION, EMULSION INTRAVENOUS at 14:13

## 2024-12-06 RX ADMIN — SODIUM CHLORIDE 125 ML/HR: 0.9 INJECTION, SOLUTION INTRAVENOUS at 13:23

## 2024-12-06 RX ADMIN — PROPOFOL 40 MG: 10 INJECTION, EMULSION INTRAVENOUS at 14:15

## 2024-12-06 RX ADMIN — PROPOFOL 40 MG: 10 INJECTION, EMULSION INTRAVENOUS at 14:12

## 2024-12-06 RX ADMIN — PROPOFOL 40 MG: 10 INJECTION, EMULSION INTRAVENOUS at 14:00

## 2024-12-06 RX ADMIN — PROPOFOL 40 MG: 10 INJECTION, EMULSION INTRAVENOUS at 13:56

## 2024-12-06 RX ADMIN — PROPOFOL 40 MG: 10 INJECTION, EMULSION INTRAVENOUS at 13:53

## 2024-12-06 RX ADMIN — PROPOFOL 40 MG: 10 INJECTION, EMULSION INTRAVENOUS at 14:21

## 2024-12-06 RX ADMIN — Medication 40 MG: at 14:12

## 2024-12-06 RX ADMIN — PROPOFOL 40 MG: 10 INJECTION, EMULSION INTRAVENOUS at 14:19

## 2024-12-06 RX ADMIN — PROPOFOL 40 MG: 10 INJECTION, EMULSION INTRAVENOUS at 14:04

## 2024-12-06 RX ADMIN — PROPOFOL 40 MG: 10 INJECTION, EMULSION INTRAVENOUS at 14:17

## 2024-12-06 RX ADMIN — PROPOFOL 40 MG: 10 INJECTION, EMULSION INTRAVENOUS at 14:02

## 2024-12-06 RX ADMIN — PROPOFOL 40 MG: 10 INJECTION, EMULSION INTRAVENOUS at 14:09

## 2024-12-06 RX ADMIN — PROPOFOL 40 MG: 10 INJECTION, EMULSION INTRAVENOUS at 13:57

## 2024-12-06 RX ADMIN — PROPOFOL 40 MG: 10 INJECTION, EMULSION INTRAVENOUS at 14:14

## 2024-12-06 RX ADMIN — PROPOFOL 40 MG: 10 INJECTION, EMULSION INTRAVENOUS at 13:55

## 2024-12-06 RX ADMIN — PROPOFOL 40 MG: 10 INJECTION, EMULSION INTRAVENOUS at 13:54

## 2024-12-06 RX ADMIN — PROPOFOL 40 MG: 10 INJECTION, EMULSION INTRAVENOUS at 14:03

## 2024-12-06 NOTE — ANESTHESIA PREPROCEDURE EVALUATION
Procedure:  COLONOSCOPY  EGD    Relevant Problems   Behavioral Health   (+) ADHD (attention deficit hyperactivity disorder)        Physical Exam    Airway    Mallampati score: II         Dental       Cardiovascular  Rhythm: regular, Rate: normal    Pulmonary   Breath sounds clear to auscultation    Other Findings        Anesthesia Plan  ASA Score- 1     Anesthesia Type- IV sedation with anesthesia with ASA Monitors.         Additional Monitors:     Airway Plan:            Plan Factors-Exercise tolerance (METS): >4 METS.    Chart reviewed.    Patient summary reviewed.    Patient is not a current smoker.      Obstructive sleep apnea risk education given perioperatively.        Induction- intravenous.    Postoperative Plan-     Perioperative Resuscitation Plan - Level 1 - Full Code.       Informed Consent- Anesthetic plan and risks discussed with patient.

## 2024-12-06 NOTE — ANESTHESIA POSTPROCEDURE EVALUATION
Post-Op Assessment Note    CV Status:  Stable  Pain Score: 0    Pain management: adequate       Mental Status:  Awake and sleepy   Hydration Status:  Euvolemic   PONV Controlled:  Controlled   Airway Patency:  Patent     Post Op Vitals Reviewed: Yes    No anethesia notable event occurred.    Staff: CRNA, Anesthesiologist           Last Filed PACU Vitals:  Vitals Value Taken Time   Temp     Pulse 80    /76    Resp 12    SpO2 97        Modified Rekha:  Activity: 2 (12/6/2024 12:58 PM)  Respiration: 2 (12/6/2024 12:58 PM)  Circulation: 2 (12/6/2024 12:58 PM)  Consciousness: 2 (12/6/2024 12:58 PM)  Oxygen Saturation: 2 (12/6/2024 12:58 PM)  Modified Rekha Score: 10 (12/6/2024 12:58 PM)

## 2024-12-06 NOTE — TELEPHONE ENCOUNTER
----- Message from Riccardo Mcqueen MD sent at 12/6/2024  2:54 PM EST -----  Regarding: Earlier follow-up  Hi,    Can this patient have closer follow-up in about 1 month? Already follows Gissell Gilmore at [facility], so okay to have the follow-up with her. Also okay to have it with me in an urgent slot.    Thanks,  Trace

## 2024-12-06 NOTE — TELEPHONE ENCOUNTER
Left pt VM to schedule follow up for the pt in 1 month, ok to use urgent slot per 's request . Requested call back to schedule this appt for the pt.

## 2024-12-09 NOTE — TELEPHONE ENCOUNTER
PA for suclarafate susp APPROVED     Date(s) approved Authorized from December 5, 2024 to December 5, 2025            Patient advised by          [][employer] Message  []Phone call   [x]LMOM  []L/M to call office as no active Communication consent on file  []Unable to leave detailed message as VM not approved on Communication consent       Pharmacy advised by    [x]Fax  []Phone call    Approval letter scanned into Media Yes

## 2024-12-11 ENCOUNTER — TRANSCRIBE ORDERS (OUTPATIENT)
Dept: GASTROENTEROLOGY | Facility: CLINIC | Age: 44
End: 2024-12-11

## 2024-12-24 ENCOUNTER — RESULTS FOLLOW-UP (OUTPATIENT)
Dept: GASTROENTEROLOGY | Facility: MEDICAL CENTER | Age: 44
End: 2024-12-24

## 2024-12-24 NOTE — TELEPHONE ENCOUNTER
I called and left a very detailed voicemail regarding pathology from recent EGD/colonoscopy.    Esophageal biopsies show up to 100 eos/hpf, confirming eosinophilic esophagitis.  I encouraged him to continue omeprazole 40 mg twice daily and to follow-up in the GI office for further discussion.    Duodenal biopsies were negative for celiac disease.    Random colon biopsies were negative for microscopic colitis.  Rectum erythema biopsies were unremarkable.    The small colon polyp which was removed was normal colon tissue.  Next colonoscopy is due in 10 years.

## 2025-01-04 DIAGNOSIS — R12 HEARTBURN: ICD-10-CM

## 2025-01-06 RX ORDER — SUCRALFATE ORAL 1 G/10ML
SUSPENSION ORAL
Qty: 3600 ML | Refills: 1 | Status: SHIPPED | OUTPATIENT
Start: 2025-01-06

## 2025-02-04 ENCOUNTER — TELEMEDICINE (OUTPATIENT)
Dept: GASTROENTEROLOGY | Facility: CLINIC | Age: 45
End: 2025-02-04
Payer: COMMERCIAL

## 2025-02-04 ENCOUNTER — TELEPHONE (OUTPATIENT)
Dept: GASTROENTEROLOGY | Facility: CLINIC | Age: 45
End: 2025-02-04

## 2025-02-04 VITALS — BODY MASS INDEX: 27.34 KG/M2 | HEIGHT: 71 IN

## 2025-02-04 DIAGNOSIS — K22.2 ESOPHAGEAL STRICTURE: ICD-10-CM

## 2025-02-04 DIAGNOSIS — K20.0 EOSINOPHILIC ESOPHAGITIS: Primary | ICD-10-CM

## 2025-02-04 DIAGNOSIS — R13.19 ESOPHAGEAL DYSPHAGIA: ICD-10-CM

## 2025-02-04 DIAGNOSIS — Z12.11 COLON CANCER SCREENING: ICD-10-CM

## 2025-02-04 PROCEDURE — 98006 SYNCH AUDIO-VIDEO EST MOD 30: CPT | Performed by: INTERNAL MEDICINE

## 2025-02-04 RX ORDER — SODIUM CHLORIDE, SODIUM LACTATE, POTASSIUM CHLORIDE, CALCIUM CHLORIDE 600; 310; 30; 20 MG/100ML; MG/100ML; MG/100ML; MG/100ML
125 INJECTION, SOLUTION INTRAVENOUS CONTINUOUS
OUTPATIENT
Start: 2025-02-04

## 2025-02-04 NOTE — PROGRESS NOTES
Virtual Regular Visit  Name: Efrain Aldana      : 1980      MRN: [id number]  Encounter Provider: Guevara Davalos MD  Encounter Date: 2025   Encounter department: [facility] GASTROENTEROLOGY SPECIALISTS [place]      Verification of patient location:  Patient is located at Home in the following state in which I hold an active license [place] :  Assessment & Plan  Eosinophilic esophagitis  He has eosinophilic esophagitis as the primary cause of his dysphagia symptoms.  We will continue omeprazole 40 mg by mouth twice a day half an hour before meals and repeat his endoscopy in a few months to document healing.  If he still has high eosinophil count in his esophagus then we will try another medication such as budesonide or Dupixent.  I encouraged him to chew his food well before swallowing and drink liquids with his meals.  Orders:    EGD; Future    Esophageal dysphagia  See above  Orders:    EGD; Future    Esophageal stricture  See above  Orders:    EGD; Future    Colon cancer screening  He is due for his next screening colonoscopy in 10 years since his last 1 was unremarkable, only a small benign hyperplastic polyp was removed during his last colonoscopy.           Encounter provider Guevara Davalos MD    The patient was identified by name and date of birth. Efrain Aldana was informed that this is a telemedicine visit and that the visit is being conducted through the Epic Embedded platform. He agrees to proceed..  My office door was closed. No one else was in the room.  He acknowledged consent and understanding of privacy and security of the video platform. The patient has agreed to participate and understands they can discontinue the visit at any time.    Patient is aware this is a billable service.     History of Present Illness     HPI he presents for follow-up after his recent upper endoscopy and colonoscopy with Dr. Mcqueen.  He was noted to have esophageal stricture, eosinophilic esophagitis, and a small hiatal  "hernia.  The stricture was dilated and he feels his symptoms have improved but not completely resolved.  His biopsies were consistent with eosinophilic esophagitis and he is now taking omeprazole 40 mg by mouth twice a day.  He denies vomiting, change in bowel habits, bleeding, and weight loss.  His colonoscopy was unremarkable so repeat colonoscopy was recommended in 10 years.  Review of Systems   Constitutional:  Negative for chills, fatigue, fever and unexpected weight change.   HENT:  Negative for trouble swallowing.    Eyes:  Negative for visual disturbance.   Respiratory:  Negative for cough and shortness of breath.    Cardiovascular:  Negative for chest pain.   Gastrointestinal:  Negative for abdominal distention, abdominal pain, blood in stool, constipation, diarrhea, nausea and vomiting.   Musculoskeletal:  Negative for arthralgias, gait problem and myalgias.   Skin:  Negative for pallor and rash.   Neurological:  Negative for dizziness, weakness and headaches.   Hematological:  Negative for adenopathy. Does not bruise/bleed easily.   Psychiatric/Behavioral:  Negative for dysphoric mood. The patient is not nervous/anxious.        Objective   Ht 5' 11\" (1.803 m)   BMI 27.34 kg/m²     Physical Exam  Constitutional:       Appearance: Normal appearance.   HENT:      Head: Normocephalic and atraumatic.   Abdominal:      General: Abdomen is flat. There is no distension.   Skin:     Coloration: Skin is not jaundiced.      Findings: No rash.       Visit Time  Total Visit Duration: 15  "

## 2025-03-04 DIAGNOSIS — R12 HEARTBURN: ICD-10-CM

## 2025-03-05 RX ORDER — SUCRALFATE ORAL 1 G/10ML
SUSPENSION ORAL
Qty: 3600 ML | Refills: 1 | Status: SHIPPED | OUTPATIENT
Start: 2025-03-05

## 2025-05-28 ENCOUNTER — TELEPHONE (OUTPATIENT)
Age: 45
End: 2025-05-28

## 2025-05-28 NOTE — TELEPHONE ENCOUNTER
Patient wife calling seeking services.  Patient is not avail at time of call. Advised to have patient call back if he wanted to be added to the [facility].

## 2025-06-30 ENCOUNTER — OFFICE VISIT (OUTPATIENT)
Dept: GASTROENTEROLOGY | Facility: CLINIC | Age: 45
End: 2025-06-30
Payer: COMMERCIAL

## 2025-06-30 VITALS
BODY MASS INDEX: 27.72 KG/M2 | TEMPERATURE: 98.4 F | DIASTOLIC BLOOD PRESSURE: 70 MMHG | HEIGHT: 71 IN | SYSTOLIC BLOOD PRESSURE: 130 MMHG | WEIGHT: 198 LBS

## 2025-06-30 DIAGNOSIS — R13.19 ESOPHAGEAL DYSPHAGIA: ICD-10-CM

## 2025-06-30 DIAGNOSIS — K20.0 EOSINOPHILIC ESOPHAGITIS: Primary | ICD-10-CM

## 2025-06-30 DIAGNOSIS — K22.2 ESOPHAGEAL STRICTURE: ICD-10-CM

## 2025-06-30 PROCEDURE — 99214 OFFICE O/P EST MOD 30 MIN: CPT | Performed by: PHYSICIAN ASSISTANT

## 2025-06-30 RX ORDER — OMEPRAZOLE 40 MG/1
40 CAPSULE, DELAYED RELEASE ORAL 2 TIMES DAILY
Qty: 60 CAPSULE | Refills: 3 | Status: SHIPPED | OUTPATIENT
Start: 2025-06-30

## 2025-06-30 NOTE — PATIENT INSTRUCTIONS
Take small bites of food and carefully chew food with sips of water  Please remain upright during and after meals for few hours    Scheduled date of EGD(as of today): 7/22/25  Physician performing EGD:Dr Mcqueen  [facility]  Instructions reviewed with patient by: Naheed  Clearances: none

## 2025-06-30 NOTE — H&P (VIEW-ONLY)
Name: Efrain Aldana      : 1980      MRN: [id number]  Encounter Provider: Eliana Noble PA-C  Encounter Date: 2025   Encounter department: [facility] [place]  :  Assessment & Plan  Eosinophilic esophagitis  We reviewed his prior EGD findings and biopsy findings.  I provided extensive education on eosinophilic esophagitis.  He recently ran out of the omeprazole medication.  He continues with intermittent difficulty swallowing although significantly improved overall since onset.  He does have some generalized fatigue/brain fog for which I recommended he follow-up with PCP/family doctor.  We also discussed that it is possible this may be related to his EOE and him currently not being on treatment.  We discussed the importance of good sleep, adequate hydration, well-rounded diet.  I recommended patient resume omeprazole 40 mg twice daily before meals.  I recommended patient schedule repeat EGD with biopsies of the esophagus to evaluate for EOE healing.  Will also place referral to allergy at this time.  All questions answered.  Patient appreciative of care.  Orders:    omeprazole (PriLOSEC) 40 MG capsule; Take 1 capsule (40 mg total) by mouth 2 (two) times a day    Ambulatory Referral to Allergy; Future    Esophageal dysphagia  As above.   Orders:    omeprazole (PriLOSEC) 40 MG capsule; Take 1 capsule (40 mg total) by mouth 2 (two) times a day    Esophageal stricture  As above.   Orders:    omeprazole (PriLOSEC) 40 MG capsule; Take 1 capsule (40 mg total) by mouth 2 (two) times a day        Patient was instructed to call the office with any questions, concerns, new/ worsening/ persisting GI symptoms. Advised patient go to the ER with any severe or worsening abdominal pain, fevers/ chills, intractable N/V, chest pain, SOB, dizziness, lightheadedness, feeling something stuck in esophagus that will not go down. Patient expressed understanding and is in agreement with  treatment plan.     Will plan to follow up after EGD     History of Present Illness   HPI  Efrain Aldana is a 44 y.o. male with  PMH of ADHD who presents to the office today for follow-up.  Patient was last seen in the office via telemedicine video visit Dr. Jiang 2/4/2025, previous office visit was reviewed.  At last office visit Dr. Davalos ordered an EGD but it does not appear that this was ever completed.  At last office visit it was recommended patient continue with omeprazole 40 mg twice daily before meals.    Patient tells me that he is feeling better overall.  Since EGD and taking omeprazole 40 mg twice daily for several months his swallowing, heartburn, reflux significantly improved.  He ran out of the omeprazole recently.  He is no longer taking the Carafate.  He does tell me that he still occasionally feels food goes down slow or get caught slightly in his mid chest before eventually going down.  This does not happen nearly as often as before.  No pain with swallowing.     He does admit to some generalized fatigue/brain fog at times, etiology unclear  He asks about diet and food allergy testing   Patient denies any fevers/ chills, unintentional weight loss, abdominal pain, nausea, vomiting, change in bowel habits, black or bloody stools  Denies chest pain, SOB    Patient does report seasonal allergies   No asthma   Patient has not seen allergies recently     Prior Procedure Results/Reports   Last EGD reviewed done 12/6/2024 and showed moderate edematous mucosa with concentric rings, exudate, linear furrows and loss of vascular pattern in the esophagus as well as an intrinsic stricture with length less than 1 cm in the lower third of the esophagus and the GE junction which is dilated to 18 mm in size.  Dilation cause bleeding and mucosal tears which was minimal.  EGD also notable for 2 cm sliding hiatal hernia.  Stomach and duodenum otherwise normal.  Duodenal biopsy was normal and negative for celiac  "disease.  Esophageal biopsies consistent with eosinophilic esophagitis.    Last Colonoscopy reviewed 12/6/2024 and showed one 4 mm sigmoid colon polyp which was removed, otherwise showed sigmoid diverticulosis and internal hemorrhoids.  The colon was otherwise unremarkable.  Terminal ileum was also unremarkable.  The pathology of the polyp was hyperplastic.  It was recommended patient undergo repeat colonoscopy in 10 years, December 2034.  Random colon biopsy was normal and negative for microscopic colitis.    Review of Systems   Constitutional:  Positive for fatigue. Negative for chills and fever.   HENT:  Positive for trouble swallowing. Negative for ear pain and sore throat.    Eyes:  Negative for pain and visual disturbance.   Respiratory:  Negative for cough and shortness of breath.    Cardiovascular:  Negative for chest pain and palpitations.   Gastrointestinal:  Negative for abdominal pain and vomiting.   Genitourinary:  Negative for dysuria and hematuria.   Musculoskeletal:  Negative for arthralgias and back pain.   Skin:  Negative for color change and rash.   Neurological:  Negative for seizures and syncope.   All other systems reviewed and are negative.      Objective   /70 (BP Location: Right arm, Patient Position: Sitting, Cuff Size: Adult)   Temp 98.4 °F (36.9 °C) (Tympanic)   Ht 5' 11\" (1.803 m)   Wt 89.8 kg (198 lb)   BMI 27.62 kg/m²      Physical Exam  Vitals reviewed.   Constitutional:       General: He is not in acute distress.     Appearance: He is well-developed. He is not ill-appearing or diaphoretic.   HENT:      Head: Normocephalic and atraumatic.     Eyes:      Conjunctiva/sclera: Conjunctivae normal.       Cardiovascular:      Rate and Rhythm: Normal rate and regular rhythm.      Heart sounds: No murmur heard.  Pulmonary:      Effort: Pulmonary effort is normal. No respiratory distress.      Breath sounds: Normal breath sounds.   Abdominal:      Palpations: Abdomen is soft.      " Tenderness: There is no abdominal tenderness.     Musculoskeletal:         General: No swelling or tenderness.      Cervical back: Neck supple.     Skin:     General: Skin is warm and dry.      Capillary Refill: Capillary refill takes less than 2 seconds.     Neurological:      General: No focal deficit present.      Mental Status: He is alert and oriented to person, place, and time. Mental status is at baseline.     Psychiatric:         Mood and Affect: Mood normal.         Behavior: Behavior normal.         Thought Content: Thought content normal.       Patient had normal/unremarkable TSH and CMP done 2/25/2025 at [facility], labs reviewed

## 2025-06-30 NOTE — PROGRESS NOTES
Name: Efrain Aldana      : 1980      MRN: [id number]  Encounter Provider: Eliana Noble PA-C  Encounter Date: 2025   Encounter department: [facility] [place]  :  Assessment & Plan  Eosinophilic esophagitis  We reviewed his prior EGD findings and biopsy findings.  I provided extensive education on eosinophilic esophagitis.  He recently ran out of the omeprazole medication.  He continues with intermittent difficulty swallowing although significantly improved overall since onset.  He does have some generalized fatigue/brain fog for which I recommended he follow-up with PCP/family doctor.  We also discussed that it is possible this may be related to his EOE and him currently not being on treatment.  We discussed the importance of good sleep, adequate hydration, well-rounded diet.  I recommended patient resume omeprazole 40 mg twice daily before meals.  I recommended patient schedule repeat EGD with biopsies of the esophagus to evaluate for EOE healing.  Will also place referral to allergy at this time.  All questions answered.  Patient appreciative of care.  Orders:    omeprazole (PriLOSEC) 40 MG capsule; Take 1 capsule (40 mg total) by mouth 2 (two) times a day    Ambulatory Referral to Allergy; Future    Esophageal dysphagia  As above.   Orders:    omeprazole (PriLOSEC) 40 MG capsule; Take 1 capsule (40 mg total) by mouth 2 (two) times a day    Esophageal stricture  As above.   Orders:    omeprazole (PriLOSEC) 40 MG capsule; Take 1 capsule (40 mg total) by mouth 2 (two) times a day        Patient was instructed to call the office with any questions, concerns, new/ worsening/ persisting GI symptoms. Advised patient go to the ER with any severe or worsening abdominal pain, fevers/ chills, intractable N/V, chest pain, SOB, dizziness, lightheadedness, feeling something stuck in esophagus that will not go down. Patient expressed understanding and is in agreement with  treatment plan.     Will plan to follow up after EGD     History of Present Illness   HPI  Efrain Aldana is a 44 y.o. male with  PMH of ADHD who presents to the office today for follow-up.  Patient was last seen in the office via telemedicine video visit Dr. Jiang 2/4/2025, previous office visit was reviewed.  At last office visit Dr. Davalos ordered an EGD but it does not appear that this was ever completed.  At last office visit it was recommended patient continue with omeprazole 40 mg twice daily before meals.    Patient tells me that he is feeling better overall.  Since EGD and taking omeprazole 40 mg twice daily for several months his swallowing, heartburn, reflux significantly improved.  He ran out of the omeprazole recently.  He is no longer taking the Carafate.  He does tell me that he still occasionally feels food goes down slow or get caught slightly in his mid chest before eventually going down.  This does not happen nearly as often as before.  No pain with swallowing.     He does admit to some generalized fatigue/brain fog at times, etiology unclear  He asks about diet and food allergy testing   Patient denies any fevers/ chills, unintentional weight loss, abdominal pain, nausea, vomiting, change in bowel habits, black or bloody stools  Denies chest pain, SOB    Patient does report seasonal allergies   No asthma   Patient has not seen allergies recently     Prior Procedure Results/Reports   Last EGD reviewed done 12/6/2024 and showed moderate edematous mucosa with concentric rings, exudate, linear furrows and loss of vascular pattern in the esophagus as well as an intrinsic stricture with length less than 1 cm in the lower third of the esophagus and the GE junction which is dilated to 18 mm in size.  Dilation cause bleeding and mucosal tears which was minimal.  EGD also notable for 2 cm sliding hiatal hernia.  Stomach and duodenum otherwise normal.  Duodenal biopsy was normal and negative for celiac  "disease.  Esophageal biopsies consistent with eosinophilic esophagitis.    Last Colonoscopy reviewed 12/6/2024 and showed one 4 mm sigmoid colon polyp which was removed, otherwise showed sigmoid diverticulosis and internal hemorrhoids.  The colon was otherwise unremarkable.  Terminal ileum was also unremarkable.  The pathology of the polyp was hyperplastic.  It was recommended patient undergo repeat colonoscopy in 10 years, December 2034.  Random colon biopsy was normal and negative for microscopic colitis.    Review of Systems   Constitutional:  Positive for fatigue. Negative for chills and fever.   HENT:  Positive for trouble swallowing. Negative for ear pain and sore throat.    Eyes:  Negative for pain and visual disturbance.   Respiratory:  Negative for cough and shortness of breath.    Cardiovascular:  Negative for chest pain and palpitations.   Gastrointestinal:  Negative for abdominal pain and vomiting.   Genitourinary:  Negative for dysuria and hematuria.   Musculoskeletal:  Negative for arthralgias and back pain.   Skin:  Negative for color change and rash.   Neurological:  Negative for seizures and syncope.   All other systems reviewed and are negative.      Objective   /70 (BP Location: Right arm, Patient Position: Sitting, Cuff Size: Adult)   Temp 98.4 °F (36.9 °C) (Tympanic)   Ht 5' 11\" (1.803 m)   Wt 89.8 kg (198 lb)   BMI 27.62 kg/m²      Physical Exam  Vitals reviewed.   Constitutional:       General: He is not in acute distress.     Appearance: He is well-developed. He is not ill-appearing or diaphoretic.   HENT:      Head: Normocephalic and atraumatic.     Eyes:      Conjunctiva/sclera: Conjunctivae normal.       Cardiovascular:      Rate and Rhythm: Normal rate and regular rhythm.      Heart sounds: No murmur heard.  Pulmonary:      Effort: Pulmonary effort is normal. No respiratory distress.      Breath sounds: Normal breath sounds.   Abdominal:      Palpations: Abdomen is soft.      " Tenderness: There is no abdominal tenderness.     Musculoskeletal:         General: No swelling or tenderness.      Cervical back: Neck supple.     Skin:     General: Skin is warm and dry.      Capillary Refill: Capillary refill takes less than 2 seconds.     Neurological:      General: No focal deficit present.      Mental Status: He is alert and oriented to person, place, and time. Mental status is at baseline.     Psychiatric:         Mood and Affect: Mood normal.         Behavior: Behavior normal.         Thought Content: Thought content normal.       Patient had normal/unremarkable TSH and CMP done 2/25/2025 at [facility], labs reviewed

## 2025-06-30 NOTE — ASSESSMENT & PLAN NOTE
We reviewed his prior EGD findings and biopsy findings.  I provided extensive education on eosinophilic esophagitis.  He recently ran out of the omeprazole medication.  He continues with intermittent difficulty swallowing although significantly improved overall since onset.  He does have some generalized fatigue/brain fog for which I recommended he follow-up with PCP/family doctor.  We also discussed that it is possible this may be related to his EOE and him currently not being on treatment.  We discussed the importance of good sleep, adequate hydration, well-rounded diet.  I recommended patient resume omeprazole 40 mg twice daily before meals.  I recommended patient schedule repeat EGD with biopsies of the esophagus to evaluate for EOE healing.  Will also place referral to allergy at this time.  All questions answered.  Patient appreciative of care.  Orders:    omeprazole (PriLOSEC) 40 MG capsule; Take 1 capsule (40 mg total) by mouth 2 (two) times a day    Ambulatory Referral to Allergy; Future

## 2025-06-30 NOTE — ASSESSMENT & PLAN NOTE
As above.   Orders:    omeprazole (PriLOSEC) 40 MG capsule; Take 1 capsule (40 mg total) by mouth 2 (two) times a day

## 2025-07-07 ENCOUNTER — ANESTHESIA (OUTPATIENT)
Dept: ANESTHESIOLOGY | Facility: HOSPITAL | Age: 45
End: 2025-07-07

## 2025-07-07 ENCOUNTER — ANESTHESIA EVENT (OUTPATIENT)
Dept: ANESTHESIOLOGY | Facility: HOSPITAL | Age: 45
End: 2025-07-07

## 2025-07-11 ENCOUNTER — TELEPHONE (OUTPATIENT)
Dept: GASTROENTEROLOGY | Facility: MEDICAL CENTER | Age: 45
End: 2025-07-11

## 2025-07-11 NOTE — TELEPHONE ENCOUNTER
Confirming Upcoming Procedure  Procedure: EGD   Date: July 22  Physician performing: Dr. Mcqueen  [facility] of procedure[facility]  Prep: EGD  Diabetic: No    Clearances and status: None

## 2025-07-11 NOTE — TELEPHONE ENCOUNTER
Left voicemail to confirm procedure details and requested call back to confirm/with any questions regarding prep/or need to reschedule

## 2025-07-22 ENCOUNTER — ANESTHESIA EVENT (OUTPATIENT)
Dept: GASTROENTEROLOGY | Facility: MEDICAL CENTER | Age: 45
End: 2025-07-22
Payer: COMMERCIAL

## 2025-07-22 ENCOUNTER — ANESTHESIA (OUTPATIENT)
Dept: GASTROENTEROLOGY | Facility: MEDICAL CENTER | Age: 45
End: 2025-07-22
Payer: COMMERCIAL

## 2025-07-22 ENCOUNTER — HOSPITAL ENCOUNTER (OUTPATIENT)
Dept: GASTROENTEROLOGY | Facility: MEDICAL CENTER | Age: 45
Setting detail: OUTPATIENT SURGERY
Discharge: HOME | End: 2025-07-22
Attending: INTERNAL MEDICINE
Payer: COMMERCIAL

## 2025-07-22 VITALS
WEIGHT: 195 LBS | SYSTOLIC BLOOD PRESSURE: 118 MMHG | RESPIRATION RATE: 18 BRPM | TEMPERATURE: 97 F | OXYGEN SATURATION: 100 % | HEART RATE: 59 BPM | HEIGHT: 71 IN | BODY MASS INDEX: 27.3 KG/M2 | DIASTOLIC BLOOD PRESSURE: 75 MMHG

## 2025-07-22 DIAGNOSIS — K22.2 ESOPHAGEAL STRICTURE: ICD-10-CM

## 2025-07-22 DIAGNOSIS — R13.19 ESOPHAGEAL DYSPHAGIA: ICD-10-CM

## 2025-07-22 DIAGNOSIS — K20.0 EOSINOPHILIC ESOPHAGITIS: ICD-10-CM

## 2025-07-22 PROBLEM — E03.9 HYPOTHYROIDISM: Status: ACTIVE | Noted: 2025-07-22

## 2025-07-22 PROCEDURE — 88305 TISSUE EXAM BY PATHOLOGIST: CPT | Performed by: PATHOLOGY

## 2025-07-22 PROCEDURE — C1726 CATH, BAL DIL, NON-VASCULAR: HCPCS

## 2025-07-22 RX ORDER — SODIUM CHLORIDE, SODIUM LACTATE, POTASSIUM CHLORIDE, CALCIUM CHLORIDE 600; 310; 30; 20 MG/100ML; MG/100ML; MG/100ML; MG/100ML
125 INJECTION, SOLUTION INTRAVENOUS CONTINUOUS
Status: DISCONTINUED | OUTPATIENT
Start: 2025-07-22 | End: 2025-07-26 | Stop reason: HOSPADM

## 2025-07-22 RX ORDER — PROPOFOL 10 MG/ML
INJECTION, EMULSION INTRAVENOUS AS NEEDED
Status: DISCONTINUED | OUTPATIENT
Start: 2025-07-22 | End: 2025-07-22

## 2025-07-22 RX ADMIN — PROPOFOL 40 MG: 10 INJECTION, EMULSION INTRAVENOUS at 13:05

## 2025-07-22 RX ADMIN — PROPOFOL 40 MG: 10 INJECTION, EMULSION INTRAVENOUS at 13:09

## 2025-07-22 RX ADMIN — PROPOFOL 40 MG: 10 INJECTION, EMULSION INTRAVENOUS at 13:02

## 2025-07-22 RX ADMIN — PROPOFOL 40 MG: 10 INJECTION, EMULSION INTRAVENOUS at 13:04

## 2025-07-22 RX ADMIN — PROPOFOL 150 MG: 10 INJECTION, EMULSION INTRAVENOUS at 13:00

## 2025-07-22 RX ADMIN — PROPOFOL 60 MG: 10 INJECTION, EMULSION INTRAVENOUS at 13:08

## 2025-07-22 RX ADMIN — SODIUM CHLORIDE, SODIUM LACTATE, POTASSIUM CHLORIDE, AND CALCIUM CHLORIDE 125 ML/HR: .6; .31; .03; .02 INJECTION, SOLUTION INTRAVENOUS at 12:47

## 2025-07-22 RX ADMIN — PROPOFOL 60 MG: 10 INJECTION, EMULSION INTRAVENOUS at 13:07

## 2025-07-22 RX ADMIN — PROPOFOL 40 MG: 10 INJECTION, EMULSION INTRAVENOUS at 13:03

## 2025-07-22 RX ADMIN — PROPOFOL 40 MG: 10 INJECTION, EMULSION INTRAVENOUS at 13:06

## 2025-07-22 RX ADMIN — PROPOFOL 40 MG: 10 INJECTION, EMULSION INTRAVENOUS at 13:01

## 2025-07-22 NOTE — ANESTHESIA POSTPROCEDURE EVALUATION
Post-Op Assessment Note    CV Status:  Stable  Pain Score: 0    Pain management: adequate       Mental Status:  Awake and sleepy   Hydration Status:  Euvolemic   PONV Controlled:  Controlled   Airway Patency:  Patent     Post Op Vitals Reviewed: Yes    No anethesia notable event occurred.    Staff: Anesthesiologist, CRNA           Last Filed PACU Vitals:  Vitals Value Taken Time   Temp     Pulse 74 07/22/25 13:13   /71 07/22/25 13:13   Resp 15 07/22/25 13:13   SpO2 96 % 07/22/25 13:13       Modified Rekha:     Vitals Value Taken Time   Activity 2 07/22/25 13:15   Respiration 2 07/22/25 13:15   Circulation 2 07/22/25 13:15   Consciousness 1 07/22/25 13:15   Oxygen Saturation 2 07/22/25 13:15     Modified Rekha Score: 9

## 2025-07-22 NOTE — ANESTHESIA PREPROCEDURE EVALUATION
Procedure:  EGD    Relevant Problems   ENDO   (+) Hypothyroidism      Behavioral Health   (+) ADHD (attention deficit hyperactivity disorder)      FEN/Gastrointestinal   (+) Eosinophilic esophagitis   (+) Esophageal stricture        Physical Exam    Airway     Mallampati score: II  TM Distance: >3 FB  Neck ROM: full      Cardiovascular      Dental   No notable dental hx     Pulmonary      Neurological    He appears awake, alert and oriented x3.      Other Findings        Anesthesia Plan  ASA Score- 2     Anesthesia Type- IV sedation with anesthesia with ASA Monitors.         Additional Monitors:     Airway Plan: natural airway.           Plan Factors-Exercise tolerance (METS): >4 METS.    Chart reviewed.    Patient summary reviewed.    Patient is not a current smoker.  Patient did not smoke on day of surgery.            Induction- intravenous.    Postoperative Plan- .   Monitoring Plan - Monitoring plan - standard ASA monitoring      Perioperative Resuscitation Plan - Level 1 - Full Code.       Informed Consent- Anesthetic plan and risks discussed with patient.  I personally reviewed this patient with the CRNA. Discussed and agreed on the Anesthesia Plan with the CRNA..      NPO Status:  No vitals data found for the desired time range.

## 2025-08-20 ENCOUNTER — TELEPHONE (OUTPATIENT)
Age: 45
End: 2025-08-20

## 2025-08-20 DIAGNOSIS — K22.2 ESOPHAGEAL STRICTURE: ICD-10-CM

## 2025-08-20 DIAGNOSIS — K20.0 EOSINOPHILIC ESOPHAGITIS: ICD-10-CM

## 2025-08-20 DIAGNOSIS — R13.19 ESOPHAGEAL DYSPHAGIA: ICD-10-CM

## 2025-08-20 RX ORDER — ESOMEPRAZOLE MAGNESIUM 40 MG/1
CAPSULE, DELAYED RELEASE ORAL
Refills: 0 | OUTPATIENT
Start: 2025-08-20

## 2025-08-20 RX ORDER — OMEPRAZOLE 40 MG/1
40 CAPSULE, DELAYED RELEASE ORAL 2 TIMES DAILY
Qty: 180 CAPSULE | Refills: 1 | Status: SHIPPED | OUTPATIENT
Start: 2025-08-20